# Patient Record
Sex: MALE | Race: WHITE | NOT HISPANIC OR LATINO | Employment: STUDENT | URBAN - METROPOLITAN AREA
[De-identification: names, ages, dates, MRNs, and addresses within clinical notes are randomized per-mention and may not be internally consistent; named-entity substitution may affect disease eponyms.]

---

## 2020-06-01 ENCOUNTER — OFFICE VISIT (OUTPATIENT)
Dept: ALLERGY | Facility: CLINIC | Age: 25
End: 2020-06-01
Payer: COMMERCIAL

## 2020-06-01 VITALS
BODY MASS INDEX: 29.4 KG/M2 | SYSTOLIC BLOOD PRESSURE: 138 MMHG | DIASTOLIC BLOOD PRESSURE: 84 MMHG | HEIGHT: 71 IN | OXYGEN SATURATION: 97 % | WEIGHT: 210 LBS | HEART RATE: 84 BPM

## 2020-06-01 DIAGNOSIS — T78.1XXA POLLEN-FOOD ALLERGY, INITIAL ENCOUNTER: ICD-10-CM

## 2020-06-01 DIAGNOSIS — J32.9 RECURRENT SINUSITIS: Primary | ICD-10-CM

## 2020-06-01 DIAGNOSIS — J31.0 CHRONIC RHINITIS: ICD-10-CM

## 2020-06-01 PROCEDURE — 3008F PR BODY MASS INDEX (BMI) DOCUMENTED: ICD-10-PCS | Mod: S$GLB,,, | Performed by: ALLERGY & IMMUNOLOGY

## 2020-06-01 PROCEDURE — 3008F BODY MASS INDEX DOCD: CPT | Mod: S$GLB,,, | Performed by: ALLERGY & IMMUNOLOGY

## 2020-06-01 PROCEDURE — 99204 PR OFFICE/OUTPT VISIT, NEW, LEVL IV, 45-59 MIN: ICD-10-PCS | Mod: S$GLB,,, | Performed by: ALLERGY & IMMUNOLOGY

## 2020-06-01 PROCEDURE — 99204 OFFICE O/P NEW MOD 45 MIN: CPT | Mod: S$GLB,,, | Performed by: ALLERGY & IMMUNOLOGY

## 2020-06-01 RX ORDER — AZELASTINE 1 MG/ML
1 SPRAY, METERED NASAL 2 TIMES DAILY
Qty: 30 ML | Refills: 3 | Status: SHIPPED | OUTPATIENT
Start: 2020-06-01 | End: 2021-06-19 | Stop reason: SDUPTHER

## 2020-06-01 RX ORDER — CETIRIZINE HYDROCHLORIDE 10 MG/1
10 TABLET ORAL DAILY
COMMUNITY

## 2020-06-01 RX ORDER — FLUTICASONE PROPIONATE 50 MCG
1 SPRAY, SUSPENSION (ML) NASAL DAILY
COMMUNITY

## 2020-06-01 NOTE — PATIENT INSTRUCTIONS
Nose:  Saline first 1-2 times per day- arm and hammer simply saline - mist   Next azelastine 1 spray per nare twice per day - if symptoms worsen, may use up to 4 times per day - may use as needed - congestion and post nasal drip.   Then fluticasone or rhinocort aqua (budesonide) 1 spray per nare twice per day    Nasal spray Technique:  Head down  Aim up and out   Spray don't sniff      Immune eval -   Call [phone] and let me know which lab to send labs to   Will also do allergy testing as well.     Labs are non fasting.     Zucchini and egg plant - make sure it is cooked first.

## 2020-06-01 NOTE — PROGRESS NOTES
"Subjective:       Patient ID: Arian Prado is a 25 y.o. male.    Chief Complaint: Recurrent Sinusitis (4-5 sinus infections/year) and Allergic Rhinitis  (always been sensitive to cats and seasonal allergies)    HPI     Pt presents as a new patient for recurrent sinusitis.     Onset: 3-4 years  Sx: ear pressure , facial pressure, congestion, increased mucus and pnd  Infections typically occur at same time as typical rhinitis sx.   Typically spring and fall.   Tx: zyrtec- as needed, flonase- works better- 1 sen qday  Never allergy tested in the past.     Pt needs abx about 2 times per year. Spring and fall typically.   Typically takes 4-5 days to resolve sx.   Pt doesn't know typical abx given.     Atopic Hx    Rhinitis see above   Food allergy denies   Oral allergy eggplant , zucchini   Asthma denies   Latex denies   Eczema denies   Urticaria denies chronic   Nsaid tolerance yes     Infection History    Pneumonia # in the past 12 months: when a "kid would have it a lot" last time 8-9 yrs of age. Hospitalized once at young age.   Sinus infection # in the past 12 months: see above   Otitis infection # in the past 12 months: last episode was 2 years ago.     Hospitalized to clear infection: none     Overwhelming warts or molluscum: none       Review of Systems    General: neg unexpected weight changes, fevers, chills, night sweats, malaise  HEENT: see hpi, Neg eye pain, vision changes, ear drainage, nose bleeds, throat tightness, sores in the mouth  CV: Neg chest pain, palpitations, swelling  Resp: see hpi, neg shortness of breath, hemoptysis, cough   GI: see hpi, neg dysphagia, night abdominal pain, reflux, chronic diarrhea, chronic constipation  Derm: See Hpi, neg new rash, neg flushing  Mu/sk: Neg joint pain, joint swelling   Psych: Neg anxiety  neuro: neg chronic headaches, muscle weakness  Endo: neg heat/cold intolerance, chronic fatigue    Objective:     Vitals:    06/01/20 1329   BP: 138/84   Pulse: 84   SpO2: " "97%   Weight: 95.3 kg (210 lb)   Height: 5' 11" (1.803 m)        Physical Exam    General: no acute distress, well developed well nourished   HEENT:   Head:normocephalic atraumatic  Eyes: MANOLO, EOMI, Neg injection, scleral icterus, or conjunctival papillary hypertrophy.  Ears: tm clear bilaterally, normal canal  Nose: 2-3+ inferior turbinates pink, neg nasal polyps            Mucosa: mild dryness             Septal irritation: none   OP: mucus membranes moist, - cobblestoning, - PND, neg erythema or lesions  Neck: supple, Full range of motion, neg lymphadenopathy  Chest: full respiratory excursion no abnormal chest abnormality  Resp: clear to ascultation bilaterally  CV: RRR, neg MRG, brisk capillary refill  Abdomen: BS+, non tender, non distended   Ext:  Neg clubbing, cyanosis, pitting edema  Skin: Neg rashes or lesions  Lymph: neg supraclavicular, axillary     Assessment:       1. Recurrent sinusitis    2. Chronic rhinitis    3. Pollen-food allergy, initial encounter        Plan:       Recurrent sinusitis  -     Streptococcus pneumoniae Antibody (IgG) (23 Serotypes); Future; Expected date: 06/01/2020  -     IgG 1, 2, 3, and 4; Future; Expected date: 06/01/2020  -     IgG; Future; Expected date: 06/01/2020  -     IgA; Future; Expected date: 06/01/2020  -     IgM; Future; Expected date: 06/01/2020    Chronic rhinitis  -     azelastine (ASTELIN) 137 mcg (0.1 %) nasal spray; 1 spray (137 mcg total) by Nasal route 2 (two) times daily.  Dispense: 30 mL; Refill: 3  -     Cat Dander IgE; Future; Expected date: 06/01/2020  -     Dog dander IgE; Future; Expected date: 06/01/2020  -     Mouse Epithelium IgE; Future; Expected date: 06/01/2020  -     Penicillium IgE; Future; Expected date: 06/01/2020  -     Cladosporium IgE; Future; Expected date: 06/01/2020  -     Aspergillus fumagatus IgE; Future; Expected date: 06/01/2020  -     RAST Allergen Candida albicans; Future; Expected date: 06/01/2020  -     Allergen-Alternaria " Alternata; Future; Expected date: 06/01/2020  -     D. pteronyssinus IgE; Future; Expected date: 06/01/2020  -     D. farinae IgE; Future; Expected date: 06/01/2020  -     Allergen-Cockroach, Romanian; Future; Expected date: 06/01/2020  -     RAST Allergen for Trichophyton rubrum; Future; Expected date: 06/01/2020  -     Allergen, E.Purpurascens; Future; Expected date: 06/01/2020  -     Curvularia lunata IgE; Future; Expected date: 06/01/2020  -     [place] grass IgE; Future; Expected date: 06/01/2020  -     Cassius IgE; Future; Expected date: 06/01/2020  -     [place]ardo grass IgE; Future; Expected date: 06/01/2020  -     [place] grass IgE; Future; Expected date: 06/01/2020  -     Ragweed, short, common IgE; Future; Expected date: 06/01/2020  -     RAST Allergen Maple ([place]); Future; Expected date: 06/01/2020  -     Allergen-Birch; Future; Expected date: 06/01/2020  -     Oak, white IgE; Future; Expected date: 06/01/2020  -     IgE Elm, American; Future; Expected date: 06/01/2020  -     Allergen-Maple [place]; Future; Expected date: 06/01/2020  -     Allergen-[place]; Future; Expected date: 06/01/2020  -     Allergen, White [place]; Future; Expected date: 06/01/2020  -     Allergen, Pecan Tree IgE; Future; Expected date: 06/01/2020  -     Italian [place] IgE; Future; Expected date: 06/01/2020  -     RAST Allergen [place]; Future; Expected date: 06/01/2020  -     Privet, common IgE; Future; Expected date: 06/01/2020  -     RAST Allergen Sweet Gum; Future; Expected date: 06/01/2020  -     IgE [place]; Future; Expected date: 06/01/2020  -     Mugwort IgE; Future; Expected date: 06/01/2020  -     Plantain, English IgE; Future; Expected date: 06/01/2020  -     Thistle, Russian IgE; Future; Expected date: 06/01/2020  -     Allergen-Common Pigweed; Future; Expected date: 06/01/2020  -     Marsh elder, rough IgE; Future; Expected date: 06/01/2020  -     RAST Allergen, Sheep [place]); Future; Expected date:  06/01/2020  -     Nettle IgE; Future; Expected date: 06/01/2020  -     IgE Dockweed, Yellow; Future; Expected date: 06/01/2020  -     IgE Fennel, Dog; Future; Expected date: 06/01/2020    Pollen-food allergy, initial encounter       Recurrent sinsusitis  Immune eval -quest      Chronic rhinitis   - start saline routinely , combination.   - allergy serum IgE - quest     Oral allergy  Egg plant and zucchini   - baked prior to ingestion.     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           Makenzie Major M.D.  Allergy/Immunology  [facility]   [phone] phone  [phone] fax

## 2020-06-11 LAB
A ALTERNATA IGE QN: <0.1 KU/L
A FUMIGATUS IGE QN: <0.1 KU/L
BAHIA GRASS IGE QN: <0.1 KU/L
BERMUDA GRASS IGE QN: <0.1 KU/L
BOXELDER IGE QN: <0.1 KU/L
C ALBICANS IGE QN: <0.1 KU/L
C HERBARUM IGE QN: <0.1 KU/L
C LUNATA IGE QN: <0.1 KU/L
CAT DANDER IGE QN: 0.83 KU/L
CMN PIGWEED IGE QN: <0.1 KU/L
COMMON RAGWEED IGE QN: <0.1 KU/L
COTTONWOOD IGE QN: <0.1 KU/L
D FARINAE IGE QN: 3.63 KU/L
D PTERONYSS IGE QN: 3.47 KU/L
DEPRECATED A ALTERNATA IGE RAST QL: 0
DEPRECATED A FUMIGATUS IGE RAST QL: 0
DEPRECATED BAHIA GRASS IGE RAST QL: 0
DEPRECATED BERMUDA GRASS IGE RAST QL: 0
DEPRECATED BOXELDER IGE RAST QL: 0
DEPRECATED C ALBICANS IGE RAST QL: 0
DEPRECATED C HERBARUM IGE RAST QL: 0
DEPRECATED C LUNATA IGE RAST QL: 0
DEPRECATED CAT DANDER IGE RAST QL: 2
DEPRECATED COMMON PIGWEED IGE RAST QL: 0
DEPRECATED COMMON RAGWEED IGE RAST QL: 0
DEPRECATED COTTONWOOD IGE RAST QL: 0
DEPRECATED D FARINAE IGE RAST QL: 3
DEPRECATED D PTERONYSS IGE RAST QL: 2
DEPRECATED DOG DANDER IGE RAST QL: 0
DEPRECATED DOG FENNEL IGE RAST QL: 0
DEPRECATED E PURPURASCENS IGE RAST QL: 0
DEPRECATED ITALIAN CYPRESS IGE RAST QL: 0
DEPRECATED JOHNSON GRASS IGE RAST QL: 0
DEPRECATED LONDON PLANE IGE RAST QL: 0
DEPRECATED MARSH ELDER IGE RAST QL: 0
DEPRECATED MOUSE EPITH IGE RAST QL: 0
DEPRECATED NETTLE IGE RAST QL: 0
DEPRECATED P NOTATUM IGE RAST QL: 0
DEPRECATED PECAN/HICK TREE IGE RAST QL: 0
DEPRECATED ROACH IGE RAST QL: 0
DEPRECATED SHEEP SORREL IGE RAST QL: 0
DEPRECATED SILVER BIRCH IGE RAST QL: 0
DEPRECATED T RUBRUM IGE RAST QL: 0
DEPRECATED TIMOTHY IGE RAST QL: 0
DEPRECATED WHITE ASH IGE RAST QL: 0
DEPRECATED WHITE ELM IGE RAST QL: 0
DEPRECATED WHITE MULBERRY IGE RAST QL: 0
DEPRECATED WHITE OAK IGE RAST QL: 0
DEPRECATED YELLOW DOCK IGE RAST QL: 0 CLASS
DOG DANDER IGE QN: <0.1 KU/L
DOG FENNEL IGE QN: <0.1 KU/L
E PURPURASCENS IGE QN: <0.1 KU/L
IGA SERPL-MCNC: 156 MG/DL (ref 47–310)
IGG SERPL-MCNC: 792 MG/DL (ref 600–1640)
IGM SERPL-MCNC: 44 MG/DL (ref 50–300)
ITALIAN CYPRESS IGE QN: <0.1 KU/L
JOHNSON GRASS IGE QN: <0.1 KU/L
LONDON PLANE IGE QN: <0.1 KU/L
MARSH ELDER IGE QN: <0.1 KU/L
MOUSE EPITH IGE QN: <0.1 KU/L
NETTLE IGE QN: <0.1 KU/L
P NOTATUM IGE QN: <0.1 KU/L
PECAN/HICK TREE IGE QN: <0.1 KU/L
REF LAB TEST REF RANGE: NORMAL
REF LAB TEST REF RANGE: NORMAL
ROACH IGE QN: <0.1 KU/L
S PN DA SERO 19F IGG SER-MCNC: 0.5 UG/ML
S PNEUM DA 1 IGG SER-MCNC: <0.3 UG/ML
S PNEUM DA 10A IGG SER-MCNC: <0.3 UG/ML
S PNEUM DA 11A IGG SER-MCNC: <0.3 UG/ML
S PNEUM DA 12F IGG SER-MCNC: <0.3 UG/ML
S PNEUM DA 14 IGG SER-MCNC: <0.3
S PNEUM DA 15B IGG SER-MCNC: <0.3 UG/ML
S PNEUM DA 17F IGG SER-MCNC: <0.3 UG/ML
S PNEUM DA 18C IGG SER-MCNC: <0.3
S PNEUM DA 19A IGG SER-MCNC: <0.3 UG/ML
S PNEUM DA 2 IGG SER-MCNC: 3.1 UG/ML
S PNEUM DA 20A IGG SER-MCNC: 1 UG/ML
S PNEUM DA 22F IGG SER-MCNC: 1 UG/ML
S PNEUM DA 23F IGG SER-MCNC: <0.3 UG/ML
S PNEUM DA 3 IGG SER-MCNC: <0.3 UG/ML
S PNEUM DA 33F IGG SER-MCNC: <0.3 UG/ML
S PNEUM DA 4 IGG SER-MCNC: <0.3 UG/ML
S PNEUM DA 5 IGG SER-MCNC: 0.4 UG/ML
S PNEUM DA 6B IGG SER-MCNC: <0.3 UG/ML
S PNEUM DA 7F IGG SER-MCNC: <0.3 UG/ML
S PNEUM DA 8 IGG SER-MCNC: <0.3 UG/ML
S PNEUM DA 9N IGG SER-MCNC: <0.3 UG/ML
S PNEUM DA 9V IGG SER-MCNC: <0.3 UG/ML
SHEEP SORREL IGE QN: <0.1 KU/L
SILVER BIRCH IGE QN: <0.1 KU/L
T RUBRUM IGE QN: <0.1 KU/L
TIMOTHY IGE QN: <0.1 KU/L
WHITE ASH IGE QN: <0.1 KU/L
WHITE ELM IGE QN: <0.1 KU/L
WHITE MULBERRY IGE QN: <0.1 KU/L
WHITE OAK IGE QN: <0.1 KU/L
YELLOW DOCK IGE QN: <0.1 KU/L

## 2020-06-14 LAB
IGG SERPL-MCNC: 713 MG/DL (ref 600–1640)
IGG1 SER-MCNC: 385 MG/DL (ref 382–929)
IGG2 SER-MCNC: 265 MG/DL (ref 241–700)
IGG3 SER-MCNC: 57 MG/DL (ref 22–178)
IGG4 SER-MCNC: 57.1 MG/DL (ref 4–86)

## 2020-06-25 ENCOUNTER — PATIENT MESSAGE (OUTPATIENT)
Dept: ALLERGY | Facility: CLINIC | Age: 25
End: 2020-06-25

## 2020-06-29 ENCOUNTER — CLINICAL SUPPORT (OUTPATIENT)
Dept: ALLERGY | Facility: CLINIC | Age: 25
End: 2020-06-29
Payer: COMMERCIAL

## 2020-06-29 DIAGNOSIS — J32.9 RECURRENT SINUSITIS: Primary | ICD-10-CM

## 2020-06-29 PROCEDURE — 90732 PNEUMOCOCCAL POLYSACCHARIDE VACCINE 23-VALENT =>2YO SQ IM: ICD-10-PCS | Mod: S$GLB,,, | Performed by: ALLERGY & IMMUNOLOGY

## 2020-06-29 PROCEDURE — 90732 PPSV23 VACC 2 YRS+ SUBQ/IM: CPT | Mod: S$GLB,,, | Performed by: ALLERGY & IMMUNOLOGY

## 2020-06-29 PROCEDURE — 90471 IMMUNIZATION ADMIN: CPT | Mod: S$GLB,,, | Performed by: ALLERGY & IMMUNOLOGY

## 2020-06-29 PROCEDURE — 90471 PNEUMOCOCCAL POLYSACCHARIDE VACCINE 23-VALENT =>2YO SQ IM: ICD-10-PCS | Mod: S$GLB,,, | Performed by: ALLERGY & IMMUNOLOGY

## 2020-06-29 NOTE — PROGRESS NOTES
Pneumovax 23 Injection given today. Tolerated well. Observed in clinic 15 minutes and discharged in good condition.

## 2020-08-10 ENCOUNTER — TELEPHONE (OUTPATIENT)
Dept: ALLERGY | Facility: CLINIC | Age: 25
End: 2020-08-10

## 2020-08-10 DIAGNOSIS — J32.9 RECURRENT SINUSITIS: Primary | ICD-10-CM

## 2020-10-19 DIAGNOSIS — J31.0 CHRONIC RHINITIS: ICD-10-CM

## 2020-10-19 RX ORDER — AZELASTINE 1 MG/ML
1 SPRAY, METERED NASAL 2 TIMES DAILY
Qty: 30 ML | Refills: 3 | Status: CANCELLED | OUTPATIENT
Start: 2020-10-19 | End: 2021-10-19

## 2021-04-29 ENCOUNTER — PATIENT MESSAGE (OUTPATIENT)
Dept: RESEARCH | Facility: HOSPITAL | Age: 26
End: 2021-04-29

## 2023-10-11 ENCOUNTER — PATIENT MESSAGE (OUTPATIENT)
Dept: FAMILY MEDICINE | Facility: CLINIC | Age: 28
End: 2023-10-11

## 2023-10-17 DIAGNOSIS — J31.0 CHRONIC RHINITIS: ICD-10-CM

## 2023-10-17 RX ORDER — AZELASTINE 1 MG/ML
1 SPRAY, METERED NASAL 2 TIMES DAILY
Qty: 90 ML | Refills: 3 | Status: SHIPPED | OUTPATIENT
Start: 2023-10-17 | End: 2024-10-16